# Patient Record
Sex: MALE | URBAN - METROPOLITAN AREA
[De-identification: names, ages, dates, MRNs, and addresses within clinical notes are randomized per-mention and may not be internally consistent; named-entity substitution may affect disease eponyms.]

---

## 2024-09-01 ENCOUNTER — NURSE TRIAGE (OUTPATIENT)
Dept: NURSING | Facility: CLINIC | Age: 19
End: 2024-09-01

## 2024-09-01 NOTE — TELEPHONE ENCOUNTER
Nurse Triage SBAR    Is this a 2nd Level Triage? NO    Situation:   Possible bulleye rash    Background:   Pt was sitting on long grass on sunday    Assessment:   On Tuesday, he noted 2 small puncture marks in a red rash.  It has gotten bigger now about the size of a tennis ball and beginning to look like a bullseye rash.  It was tender and painful at first but hasn't been painful now.   No fevers.    Protocol Recommended Disposition:   See PCP Within 24 Hours    Recommendation:   Advised pt to be seen within 24 hours.  Pt plans to be seen tomorrow.    Farrah Maria, RN, BSN Nurse Triage Advisor 9/1/2024 3:40 PM       Reason for Disposition   [1] Red or very tender (to touch) area AND [2] started over 24 hours after the bite    Additional Information   Negative: [1] Life-threatening reaction (anaphylaxis) in the past to bite from same insect AND [2] < 2 hours since bite   Negative: Passed out (i.e., lost consciousness, collapsed and was not responding)   Negative: Difficulty breathing or wheezing   Negative: [1] Hoarseness or cough AND [2] sudden onset following bite   Negative: [1] Difficulty swallowing or slurred speech AND [2] sudden onset following bite   Negative: Sounds like a life-threatening emergency to the triager   Negative: Bee sting(s)   Negative: Fire ant sting   Negative: Spider bite(s)   Negative: Tick bite(s)   Negative: Mosquito bite(s)   Negative: Bed bug bite(s)   Negative: Boil suspected (i.e., painful red lump and NO insect bite)   Negative: Doesn't sound like an insect bite   Negative: Patient sounds very sick or weak to the triager   Negative: [1] SEVERE bite pain AND [2] not improved after 2 hours of pain medicine   Negative: [1] Fever AND [2] red area   Negative: [1] Fever AND [2] area is very tender to touch   Negative: [1] Red streak or red line AND [2] length > 2 inches (5 cm)    Protocols used: Insect Bite-A-